# Patient Record
Sex: MALE | Race: BLACK OR AFRICAN AMERICAN | NOT HISPANIC OR LATINO | Employment: OTHER | URBAN - METROPOLITAN AREA
[De-identification: names, ages, dates, MRNs, and addresses within clinical notes are randomized per-mention and may not be internally consistent; named-entity substitution may affect disease eponyms.]

---

## 2024-01-01 ENCOUNTER — HOSPITAL ENCOUNTER (INPATIENT)
Facility: HOSPITAL | Age: 84
LOS: 1 days | DRG: 208 | End: 2024-09-30
Attending: EMERGENCY MEDICINE | Admitting: SURGERY
Payer: MEDICARE

## 2024-01-01 VITALS
HEART RATE: 82 BPM | DIASTOLIC BLOOD PRESSURE: 35 MMHG | WEIGHT: 119.94 LBS | SYSTOLIC BLOOD PRESSURE: 55 MMHG | OXYGEN SATURATION: 100 % | RESPIRATION RATE: 26 BRPM | BODY MASS INDEX: 18.18 KG/M2 | HEIGHT: 68 IN | TEMPERATURE: 99 F

## 2024-01-01 DIAGNOSIS — N17.9 AKI (ACUTE KIDNEY INJURY): ICD-10-CM

## 2024-01-01 DIAGNOSIS — K92.2 GASTROINTESTINAL HEMORRHAGE, UNSPECIFIED GASTROINTESTINAL HEMORRHAGE TYPE: Primary | ICD-10-CM

## 2024-01-01 DIAGNOSIS — J93.9 PNEUMOTHORAX: ICD-10-CM

## 2024-01-01 DIAGNOSIS — D64.9 ANEMIA, UNSPECIFIED TYPE: ICD-10-CM

## 2024-01-01 DIAGNOSIS — R79.89 ELEVATED TROPONIN: ICD-10-CM

## 2024-01-01 DIAGNOSIS — J93.9 PNEUMOTHORAX, UNSPECIFIED TYPE: ICD-10-CM
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
## 2024-01-01 DIAGNOSIS — S42.401A CLOSED FRACTURE OF DISTAL END OF RIGHT HUMERUS, UNSPECIFIED FRACTURE MORPHOLOGY, INITIAL ENCOUNTER: ICD-10-CM

## 2024-01-01 DIAGNOSIS — R91.8 LUNG MASS: ICD-10-CM

## 2024-01-01 LAB
ABO + RH BLD: NORMAL
ABO + RH BLD: NORMAL
ABORH RETYPE: NORMAL
ABS NEUT (OLG): 12.23 X10(3)/MCL (ref 2.1–9.2)
ABS NEUT (OLG): 2.22 X10(3)/MCL (ref 2.1–9.2)
ALBUMIN SERPL-MCNC: 2.3 G/DL (ref 3.4–4.8)
ALBUMIN SERPL-MCNC: 3.1 G/DL (ref 3.4–4.8)
ALBUMIN/GLOB SERPL: 1.2 RATIO (ref 1.1–2)
ALBUMIN/GLOB SERPL: 1.2 RATIO (ref 1.1–2)
ALLENS TEST BLOOD GAS (OHS): ABNORMAL
ALP SERPL-CCNC: 42 UNIT/L (ref 40–150)
ALP SERPL-CCNC: 51 UNIT/L (ref 40–150)
ALT SERPL-CCNC: 37 UNIT/L (ref 0–55)
ALT SERPL-CCNC: 41 UNIT/L (ref 0–55)
AMPHET UR QL SCN: NEGATIVE
ANION GAP SERPL CALC-SCNC: 12 MEQ/L
ANION GAP SERPL CALC-SCNC: 13 MEQ/L
ANISOCYTOSIS BLD QL SMEAR: ABNORMAL
APTT PPP: 29.3 SECONDS (ref 23.2–33.7)
AST SERPL-CCNC: 79 UNIT/L (ref 5–34)
AST SERPL-CCNC: 94 UNIT/L (ref 5–34)
AV INDEX (PROSTH): 0.85
AV MEAN GRADIENT: 2 MMHG
AV PEAK GRADIENT: 4.8 MMHG
AV VALVE AREA BY VELOCITY RATIO: 2.6 CM²
AV VALVE AREA: 2.7 CM²
AV VELOCITY RATIO: 0.82
BARBITURATE SCN PRESENT UR: NEGATIVE
BASE EXCESS BLD CALC-SCNC: -16 MMOL/L (ref -2–2)
BASE EXCESS BLD CALC-SCNC: -2.6 MMOL/L (ref -2–2)
BASE EXCESS BLD CALC-SCNC: -9.9 MMOL/L (ref -2–2)
BASOPHILS NFR BLD MANUAL: 0.03 X10(3)/MCL (ref 0–0.2)
BASOPHILS NFR BLD MANUAL: 1 %
BENZODIAZ UR QL SCN: NEGATIVE
BILIRUB SERPL-MCNC: 0.5 MG/DL
BILIRUB SERPL-MCNC: 0.8 MG/DL
BLD PROD TYP BPU: NORMAL
BLD PROD TYP BPU: NORMAL
BLOOD GAS SAMPLE TYPE (OHS): ABNORMAL
BLOOD UNIT EXPIRATION DATE: NORMAL
BLOOD UNIT EXPIRATION DATE: NORMAL
BLOOD UNIT TYPE CODE: 5100
BLOOD UNIT TYPE CODE: 5100
BNP BLD-MCNC: 354.8 PG/ML
BSA FOR ECHO PROCEDURE: 1.62 M2
BUN SERPL-MCNC: 76 MG/DL (ref 8.4–25.7)
BUN SERPL-MCNC: 77.5 MG/DL (ref 8.4–25.7)
BURR CELLS (OLG): ABNORMAL
BURR CELLS (OLG): ABNORMAL
CA-I BLD-SCNC: 1.08 MMOL/L (ref 1.12–1.23)
CA-I BLD-SCNC: 1.22 MMOL/L (ref 1.12–1.23)
CA-I BLD-SCNC: 1.32 MMOL/L (ref 1.12–1.23)
CALCIUM SERPL-MCNC: 7.8 MG/DL (ref 8.8–10)
CALCIUM SERPL-MCNC: 8.3 MG/DL (ref 8.8–10)
CANNABINOIDS UR QL SCN: NEGATIVE
CHLORIDE SERPL-SCNC: 126 MMOL/L (ref 98–107)
CHLORIDE SERPL-SCNC: 129 MMOL/L (ref 98–107)
CK SERPL-CCNC: 1384 U/L (ref 30–200)
CO2 BLDA-SCNC: 18 MMOL/L
CO2 BLDA-SCNC: 19 MMOL/L
CO2 BLDA-SCNC: 24.6 MMOL/L
CO2 SERPL-SCNC: 12 MMOL/L (ref 23–31)
CO2 SERPL-SCNC: 19 MMOL/L (ref 23–31)
COCAINE UR QL SCN: NEGATIVE
COHGB MFR BLDA: 0 % (ref 0.5–1.5)
COHGB MFR BLDA: 0 % (ref 0.5–1.5)
COHGB MFR BLDA: 0.7 % (ref 0.5–1.5)
CREAT SERPL-MCNC: 3.32 MG/DL (ref 0.73–1.18)
CREAT SERPL-MCNC: 3.75 MG/DL (ref 0.73–1.18)
CREAT/UREA NIT SERPL: 20
CREAT/UREA NIT SERPL: 23
CROSSMATCH INTERPRETATION: NORMAL
CROSSMATCH INTERPRETATION: NORMAL
CRP SERPL-MCNC: 71.7 MG/L
CV ECHO LV RWT: 0.44 CM
DISPENSE STATUS: NORMAL
DISPENSE STATUS: NORMAL
DOP CALC AO PEAK VEL: 1.1 M/S
DOP CALC AO VTI: 17.2 CM
DOP CALC LVOT AREA: 3.1 CM2
DOP CALC LVOT DIAMETER: 2 CM
DOP CALC LVOT PEAK VEL: 0.9 M/S
DOP CALC LVOT STROKE VOLUME: 46.2 CM3
DOP CALCLVOT PEAK VEL VTI: 14.7 CM
DRAWN BY BLOOD GAS (OHS): ABNORMAL
ECHO LV POSTERIOR WALL: 0.7 CM (ref 0.6–1.1)
ERYTHROCYTE [DISTWIDTH] IN BLOOD BY AUTOMATED COUNT: 18.6 % (ref 11.5–17)
ERYTHROCYTE [DISTWIDTH] IN BLOOD BY AUTOMATED COUNT: 18.8 % (ref 11.5–17)
ETHANOL SERPL-MCNC: <10 MG/DL
FENTANYL UR QL SCN: NEGATIVE
FLOW (OHS): 50 LPM
FLUAV AG UPPER RESP QL IA.RAPID: NOT DETECTED
FLUBV AG UPPER RESP QL IA.RAPID: NOT DETECTED
FRACTIONAL SHORTENING: 12.5 % (ref 28–44)
GFR SERPLBLD CREATININE-BSD FMLA CKD-EPI: 15 ML/MIN/1.73/M2
GFR SERPLBLD CREATININE-BSD FMLA CKD-EPI: 18 ML/MIN/1.73/M2
GLOBULIN SER-MCNC: 1.9 GM/DL (ref 2.4–3.5)
GLOBULIN SER-MCNC: 2.5 GM/DL (ref 2.4–3.5)
GLUCOSE SERPL-MCNC: 100 MG/DL (ref 82–115)
GLUCOSE SERPL-MCNC: 90 MG/DL (ref 82–115)
GROUP & RH: NORMAL
HCO3 BLDA-SCNC: 15.5 MMOL/L (ref 22–26)
HCO3 BLDA-SCNC: 17.6 MMOL/L (ref 22–26)
HCO3 BLDA-SCNC: 23.2 MMOL/L (ref 22–26)
HCT VFR BLD AUTO: 20.3 % (ref 42–52)
HCT VFR BLD AUTO: 25 % (ref 42–52)
HGB BLD-MCNC: 6 G/DL (ref 14–18)
HGB BLD-MCNC: 8.3 G/DL (ref 14–18)
INDIRECT COOMBS: NORMAL
INHALED O2 CONCENTRATION: 100 %
INHALED O2 CONCENTRATION: 70 %
INR PPP: 1.4
INSTRUMENT WBC (OLG): 13.74 X10(3)/MCL
INSTRUMENT WBC (OLG): 2.77 X10(3)/MCL
INTERVENTRICULAR SEPTUM: 0.9 CM (ref 0.6–1.1)
LACTATE SERPL-SCNC: 1.1 MMOL/L (ref 0.5–2.2)
LACTATE SERPL-SCNC: 2 MMOL/L (ref 0.5–2.2)
LEFT ATRIUM SIZE: 3 CM
LEFT INTERNAL DIMENSION IN SYSTOLE: 2.8 CM (ref 2.1–4)
LEFT VENTRICLE MASS INDEX: 39.8 G/M2
LEFT VENTRICULAR INTERNAL DIMENSION IN DIASTOLE: 3.2 CM (ref 3.5–6)
LEFT VENTRICULAR MASS: 65.3 G
LIPASE SERPL-CCNC: 19 U/L
LPM (OHS): 6
LYMPHOCYTES NFR BLD MANUAL: 0.44 X10(3)/MCL
LYMPHOCYTES NFR BLD MANUAL: 0.96 X10(3)/MCL
LYMPHOCYTES NFR BLD MANUAL: 16 %
LYMPHOCYTES NFR BLD MANUAL: 7 %
MACROCYTES BLD QL SMEAR: ABNORMAL
MAGNESIUM SERPL-MCNC: 2 MG/DL (ref 1.6–2.6)
MAGNESIUM SERPL-MCNC: 2.5 MG/DL (ref 1.6–2.6)
MCH RBC QN AUTO: 31.4 PG (ref 27–31)
MCH RBC QN AUTO: 34.7 PG (ref 27–31)
MCHC RBC AUTO-ENTMCNC: 29.6 G/DL (ref 33–36)
MCHC RBC AUTO-ENTMCNC: 33.2 G/DL (ref 33–36)
MCV RBC AUTO: 117.3 FL (ref 80–94)
MCV RBC AUTO: 94.7 FL (ref 80–94)
MDMA UR QL SCN: NEGATIVE
MECH RR (OHS): 26 B/MIN
MECH RR (OHS): 26 B/MIN
METHGB MFR BLDA: 0 % (ref 0.4–1.5)
MODE (OHS): AC
MODE (OHS): AC
MONOCYTES NFR BLD MANUAL: 0.55 X10(3)/MCL (ref 0.1–1.3)
MONOCYTES NFR BLD MANUAL: 4 %
MYELOCYTES NFR BLD MANUAL: 3 %
NEUTROPHILS NFR BLD MANUAL: 80 %
NEUTROPHILS NFR BLD MANUAL: 89 %
NRBC BLD AUTO-RTO: 15.4 %
NRBC BLD AUTO-RTO: 41.5 %
NRBC BLD MANUAL-RTO: 108 %
NRBC BLD MANUAL-RTO: 35 %
O2 HB BLOOD GAS (OHS): 89.1 % (ref 94–97)
O2 HB BLOOD GAS (OHS): 90.9 % (ref 94–97)
O2 HB BLOOD GAS (OHS): 96.3 % (ref 94–97)
OHS QRS DURATION: 116 MS
OHS QRS DURATION: 116 MS
OHS QTC CALCULATION: 442 MS
OHS QTC CALCULATION: 476 MS
OPIATES UR QL SCN: NEGATIVE
OXYGEN DEVICE BLOOD GAS (OHS): ABNORMAL
OXYHGB MFR BLDA: 7.7 G/DL (ref 12–16)
OXYHGB MFR BLDA: 8.1 G/DL (ref 12–16)
OXYHGB MFR BLDA: <6.5 G/DL (ref 12–16)
PCO2 BLDA: 44 MMHG (ref 35–45)
PCO2 BLDA: 46 MMHG (ref 35–45)
PCO2 BLDA: 81 MMHG (ref 35–45)
PCP UR QL: NEGATIVE
PEEP RESPIRATORY: 5 CMH2O
PEEP RESPIRATORY: 8 CMH2O
PH BLDA: 7.19 [PH] (ref 7.35–7.45)
PH BLDA: 7.33 [PH] (ref 7.35–7.45)
PH BLDA: <6.95 [PH] (ref 7.35–7.45)
PH UR: 6 [PH] (ref 3–11)
PHOSPHATE SERPL-MCNC: 6.4 MG/DL (ref 2.3–4.7)
PISA TR MAX VEL: 2.2 M/S
PLATELET # BLD AUTO: 172 X10(3)/MCL (ref 130–400)
PLATELET # BLD AUTO: 212 X10(3)/MCL (ref 130–400)
PLATELET # BLD EST: NORMAL 10*3/UL
PLATELET # BLD EST: NORMAL 10*3/UL
PMV BLD AUTO: 11.1 FL (ref 7.4–10.4)
PMV BLD AUTO: 11.6 FL (ref 7.4–10.4)
PO2 BLDA: 143 MMHG (ref 80–100)
PO2 BLDA: 58 MMHG (ref 80–100)
PO2 BLDA: 73 MMHG (ref 80–100)
POCT GLUCOSE: 122 MG/DL (ref 70–110)
POIKILOCYTOSIS BLD QL SMEAR: ABNORMAL
POIKILOCYTOSIS BLD QL SMEAR: ABNORMAL
POLYCHROMASIA BLD QL SMEAR: ABNORMAL
POTASSIUM BLOOD GAS (OHS): 3.7 MMOL/L (ref 3.5–5)
POTASSIUM BLOOD GAS (OHS): 4.8 MMOL/L (ref 3.5–5)
POTASSIUM BLOOD GAS (OHS): 5.7 MMOL/L (ref 3.5–5)
POTASSIUM SERPL-SCNC: 4.4 MMOL/L (ref 3.5–5.1)
POTASSIUM SERPL-SCNC: 5.6 MMOL/L (ref 3.5–5.1)
PREALB SERPL-MCNC: 12.7 MG/DL (ref 16–42)
PROT SERPL-MCNC: 4.2 GM/DL (ref 5.8–7.6)
PROT SERPL-MCNC: 5.6 GM/DL (ref 5.8–7.6)
PROTHROMBIN TIME: 16.8 SECONDS (ref 12.5–14.5)
RBC # BLD AUTO: 1.73 X10(6)/MCL (ref 4.7–6.1)
RBC # BLD AUTO: 2.64 X10(6)/MCL (ref 4.7–6.1)
RBC MORPH BLD: ABNORMAL
RBC MORPH BLD: ABNORMAL
SAMPLE SITE BLOOD GAS (OHS): ABNORMAL
SAO2 % BLDA: 78.4 %
SAO2 % BLDA: 87.5 %
SAO2 % BLDA: 98.7 %
SARS-COV-2 RNA RESP QL NAA+PROBE: NOT DETECTED
SODIUM BLOOD GAS (OHS): 148 MMOL/L (ref 137–145)
SODIUM BLOOD GAS (OHS): 149 MMOL/L (ref 137–145)
SODIUM BLOOD GAS (OHS): 152 MMOL/L (ref 137–145)
SODIUM SERPL-SCNC: 153 MMOL/L (ref 136–145)
SODIUM SERPL-SCNC: 158 MMOL/L (ref 136–145)
SPECIFIC GRAVITY, URINE AUTO (.000) (OHS): 1.02 (ref 1–1.03)
SPECIMEN OUTDATE: NORMAL
SPONT+MECH VT ON VENT: 450 ML
SPONT+MECH VT ON VENT: 450 ML
TARGETS BLD QL SMEAR: ABNORMAL
TR MAX PG: 19 MMHG
TRICUSPID ANNULAR PLANE SYSTOLIC EXCURSION: 1.2 CM
TROPONIN I SERPL-MCNC: 0.04 NG/ML (ref 0–0.04)
TROPONIN I SERPL-MCNC: 0.05 NG/ML (ref 0–0.04)
TROPONIN I SERPL-MCNC: 0.05 NG/ML (ref 0–0.04)
UNIT NUMBER: NORMAL
UNIT NUMBER: NORMAL
WBC # BLD AUTO: 13.74 X10(3)/MCL (ref 4.5–11.5)
WBC # BLD AUTO: 2.77 X10(3)/MCL (ref 4.5–11.5)
Z-SCORE OF LEFT VENTRICULAR DIMENSION IN END DIASTOLE: -3.66
Z-SCORE OF LEFT VENTRICULAR DIMENSION IN END SYSTOLE: -0.17

## 2024-01-01 PROCEDURE — 85007 BL SMEAR W/DIFF WBC COUNT: CPT

## 2024-01-01 PROCEDURE — 86140 C-REACTIVE PROTEIN: CPT

## 2024-01-01 PROCEDURE — 99291 CRITICAL CARE FIRST HOUR: CPT | Mod: ,,, | Performed by: SURGERY

## 2024-01-01 PROCEDURE — 20800000 HC ICU TRAUMA

## 2024-01-01 PROCEDURE — 25000003 PHARM REV CODE 250

## 2024-01-01 PROCEDURE — 85007 BL SMEAR W/DIFF WBC COUNT: CPT | Performed by: EMERGENCY MEDICINE

## 2024-01-01 PROCEDURE — 36600 WITHDRAWAL OF ARTERIAL BLOOD: CPT

## 2024-01-01 PROCEDURE — 99900031 HC PATIENT EDUCATION (STAT)

## 2024-01-01 PROCEDURE — P9016 RBC LEUKOCYTES REDUCED: HCPCS | Performed by: EMERGENCY MEDICINE

## 2024-01-01 PROCEDURE — 84484 ASSAY OF TROPONIN QUANT: CPT | Performed by: EMERGENCY MEDICINE

## 2024-01-01 PROCEDURE — 86923 COMPATIBILITY TEST ELECTRIC: CPT | Mod: 91 | Performed by: EMERGENCY MEDICINE

## 2024-01-01 PROCEDURE — 63600175 PHARM REV CODE 636 W HCPCS

## 2024-01-01 PROCEDURE — 63600175 PHARM REV CODE 636 W HCPCS: Performed by: SURGERY

## 2024-01-01 PROCEDURE — 84134 ASSAY OF PREALBUMIN: CPT

## 2024-01-01 PROCEDURE — 85027 COMPLETE CBC AUTOMATED: CPT | Performed by: EMERGENCY MEDICINE

## 2024-01-01 PROCEDURE — 82803 BLOOD GASES ANY COMBINATION: CPT

## 2024-01-01 PROCEDURE — 83605 ASSAY OF LACTIC ACID: CPT | Performed by: EMERGENCY MEDICINE

## 2024-01-01 PROCEDURE — 0BH17EZ INSERTION OF ENDOTRACHEAL AIRWAY INTO TRACHEA, VIA NATURAL OR ARTIFICIAL OPENING: ICD-10-PCS

## 2024-01-01 PROCEDURE — 36415 COLL VENOUS BLD VENIPUNCTURE: CPT

## 2024-01-01 PROCEDURE — 99900035 HC TECH TIME PER 15 MIN (STAT)

## 2024-01-01 PROCEDURE — 63600175 PHARM REV CODE 636 W HCPCS: Performed by: EMERGENCY MEDICINE

## 2024-01-01 PROCEDURE — 83735 ASSAY OF MAGNESIUM: CPT | Performed by: EMERGENCY MEDICINE

## 2024-01-01 PROCEDURE — 0240U COVID/FLU A&B PCR: CPT | Performed by: EMERGENCY MEDICINE

## 2024-01-01 PROCEDURE — 86901 BLOOD TYPING SEROLOGIC RH(D): CPT | Performed by: EMERGENCY MEDICINE

## 2024-01-01 PROCEDURE — 84100 ASSAY OF PHOSPHORUS: CPT

## 2024-01-01 PROCEDURE — 80053 COMPREHEN METABOLIC PANEL: CPT | Performed by: EMERGENCY MEDICINE

## 2024-01-01 PROCEDURE — 0W9930Z DRAINAGE OF RIGHT PLEURAL CAVITY WITH DRAINAGE DEVICE, PERCUTANEOUS APPROACH: ICD-10-PCS | Performed by: EMERGENCY MEDICINE

## 2024-01-01 PROCEDURE — 80053 COMPREHEN METABOLIC PANEL: CPT

## 2024-01-01 PROCEDURE — 96361 HYDRATE IV INFUSION ADD-ON: CPT | Mod: 59

## 2024-01-01 PROCEDURE — 84484 ASSAY OF TROPONIN QUANT: CPT

## 2024-01-01 PROCEDURE — 86900 BLOOD TYPING SEROLOGIC ABO: CPT | Performed by: EMERGENCY MEDICINE

## 2024-01-01 PROCEDURE — 82550 ASSAY OF CK (CPK): CPT | Performed by: EMERGENCY MEDICINE

## 2024-01-01 PROCEDURE — 86850 RBC ANTIBODY SCREEN: CPT | Performed by: EMERGENCY MEDICINE

## 2024-01-01 PROCEDURE — 83880 ASSAY OF NATRIURETIC PEPTIDE: CPT | Performed by: EMERGENCY MEDICINE

## 2024-01-01 PROCEDURE — 25000003 PHARM REV CODE 250: Performed by: SURGERY

## 2024-01-01 PROCEDURE — 87040 BLOOD CULTURE FOR BACTERIA: CPT | Performed by: EMERGENCY MEDICINE

## 2024-01-01 PROCEDURE — 83605 ASSAY OF LACTIC ACID: CPT

## 2024-01-01 PROCEDURE — 94760 N-INVAS EAR/PLS OXIMETRY 1: CPT | Mod: XB

## 2024-01-01 PROCEDURE — 25000003 PHARM REV CODE 250: Mod: JZ,JG | Performed by: EMERGENCY MEDICINE

## 2024-01-01 PROCEDURE — 80307 DRUG TEST PRSMV CHEM ANLYZR: CPT | Performed by: EMERGENCY MEDICINE

## 2024-01-01 PROCEDURE — 99291 CRITICAL CARE FIRST HOUR: CPT | Mod: 25

## 2024-01-01 PROCEDURE — 93010 ELECTROCARDIOGRAM REPORT: CPT | Mod: ,,, | Performed by: STUDENT IN AN ORGANIZED HEALTH CARE EDUCATION/TRAINING PROGRAM

## 2024-01-01 PROCEDURE — 31500 INSERT EMERGENCY AIRWAY: CPT

## 2024-01-01 PROCEDURE — 85610 PROTHROMBIN TIME: CPT | Performed by: EMERGENCY MEDICINE

## 2024-01-01 PROCEDURE — 99223 1ST HOSP IP/OBS HIGH 75: CPT | Mod: FS,,, | Performed by: NURSE PRACTITIONER

## 2024-01-01 PROCEDURE — 96374 THER/PROPH/DIAG INJ IV PUSH: CPT | Mod: 59

## 2024-01-01 PROCEDURE — 5A1935Z RESPIRATORY VENTILATION, LESS THAN 24 CONSECUTIVE HOURS: ICD-10-PCS

## 2024-01-01 PROCEDURE — 82077 ASSAY SPEC XCP UR&BREATH IA: CPT | Performed by: EMERGENCY MEDICINE

## 2024-01-01 PROCEDURE — 93005 ELECTROCARDIOGRAM TRACING: CPT

## 2024-01-01 PROCEDURE — 36430 TRANSFUSION BLD/BLD COMPNT: CPT | Mod: 59

## 2024-01-01 PROCEDURE — 83735 ASSAY OF MAGNESIUM: CPT

## 2024-01-01 PROCEDURE — 85730 THROMBOPLASTIN TIME PARTIAL: CPT | Performed by: EMERGENCY MEDICINE

## 2024-01-01 PROCEDURE — 96375 TX/PRO/DX INJ NEW DRUG ADDON: CPT | Mod: 59

## 2024-01-01 PROCEDURE — 27100171 HC OXYGEN HIGH FLOW UP TO 24 HOURS

## 2024-01-01 PROCEDURE — 85027 COMPLETE CBC AUTOMATED: CPT

## 2024-01-01 PROCEDURE — 83690 ASSAY OF LIPASE: CPT | Performed by: EMERGENCY MEDICINE

## 2024-01-01 PROCEDURE — 94002 VENT MGMT INPAT INIT DAY: CPT

## 2024-01-01 RX ORDER — CALCIUM GLUCONATE 20 MG/ML
1 INJECTION, SOLUTION INTRAVENOUS
Status: COMPLETED | OUTPATIENT
Start: 2024-01-01 | End: 2024-01-01

## 2024-01-01 RX ORDER — SODIUM BICARBONATE 1 MEQ/ML
100 SYRINGE (ML) INTRAVENOUS ONCE
Status: COMPLETED | OUTPATIENT
Start: 2024-01-01 | End: 2024-01-01

## 2024-01-01 RX ORDER — LIDOCAINE HYDROCHLORIDE 10 MG/ML
10 INJECTION, SOLUTION INFILTRATION; PERINEURAL ONCE
Status: DISCONTINUED | OUTPATIENT
Start: 2024-01-01 | End: 2024-01-01 | Stop reason: HOSPADM

## 2024-01-01 RX ORDER — MUPIROCIN 20 MG/G
OINTMENT TOPICAL 2 TIMES DAILY
Status: DISCONTINUED | OUTPATIENT
Start: 2024-10-02 | End: 2024-01-01 | Stop reason: HOSPADM

## 2024-01-01 RX ORDER — INDOMETHACIN 25 MG/1
CAPSULE ORAL
Status: DISCONTINUED
Start: 2024-01-01 | End: 2024-01-01 | Stop reason: HOSPADM

## 2024-01-01 RX ORDER — POLYETHYLENE GLYCOL 3350 17 G/17G
17 POWDER, FOR SOLUTION ORAL 2 TIMES DAILY
Status: DISCONTINUED | OUTPATIENT
Start: 2024-01-01 | End: 2024-01-01 | Stop reason: HOSPADM

## 2024-01-01 RX ORDER — SODIUM CHLORIDE, SODIUM LACTATE, POTASSIUM CHLORIDE, CALCIUM CHLORIDE 600; 310; 30; 20 MG/100ML; MG/100ML; MG/100ML; MG/100ML
INJECTION, SOLUTION INTRAVENOUS CONTINUOUS
Status: DISCONTINUED | OUTPATIENT
Start: 2024-01-01 | End: 2024-01-01 | Stop reason: HOSPADM

## 2024-01-01 RX ORDER — PROPOFOL 10 MG/ML
INJECTION, EMULSION INTRAVENOUS
Status: COMPLETED
Start: 2024-01-01 | End: 2024-01-01

## 2024-01-01 RX ORDER — ETOMIDATE 2 MG/ML
INJECTION INTRAVENOUS CODE/TRAUMA/SEDATION MEDICATION
Status: COMPLETED | OUTPATIENT
Start: 2024-01-01 | End: 2024-01-01

## 2024-01-01 RX ORDER — SODIUM BICARBONATE 1 MEQ/ML
VIAL (ML) INTRAVENOUS CONTINUOUS
Status: DISCONTINUED | OUTPATIENT
Start: 2024-01-01 | End: 2024-01-01

## 2024-01-01 RX ORDER — HYDROCODONE BITARTRATE AND ACETAMINOPHEN 500; 5 MG/1; MG/1
TABLET ORAL
Status: DISCONTINUED | OUTPATIENT
Start: 2024-01-01 | End: 2024-01-01 | Stop reason: HOSPADM

## 2024-01-01 RX ORDER — BISACODYL 10 MG/1
10 SUPPOSITORY RECTAL DAILY PRN
Status: DISCONTINUED | OUTPATIENT
Start: 2024-01-01 | End: 2024-01-01 | Stop reason: HOSPADM

## 2024-01-01 RX ORDER — PROPOFOL 10 MG/ML
0-50 INJECTION, EMULSION INTRAVENOUS CONTINUOUS
Status: DISCONTINUED | OUTPATIENT
Start: 2024-01-01 | End: 2024-01-01 | Stop reason: HOSPADM

## 2024-01-01 RX ORDER — ACETAMINOPHEN 325 MG/1
650 TABLET ORAL EVERY 4 HOURS
Status: DISCONTINUED | OUTPATIENT
Start: 2024-01-01 | End: 2024-01-01 | Stop reason: HOSPADM

## 2024-01-01 RX ORDER — ATROPINE SULFATE 0.1 MG/ML
INJECTION INTRAVENOUS CODE/TRAUMA/SEDATION MEDICATION
Status: COMPLETED | OUTPATIENT
Start: 2024-01-01 | End: 2024-01-01

## 2024-01-01 RX ORDER — SODIUM BICARBONATE 1 MEQ/ML
SYRINGE (ML) INTRAVENOUS CODE/TRAUMA/SEDATION MEDICATION
Status: COMPLETED | OUTPATIENT
Start: 2024-01-01 | End: 2024-01-01

## 2024-01-01 RX ORDER — OXYCODONE HYDROCHLORIDE 5 MG/1
5 TABLET ORAL EVERY 4 HOURS PRN
Status: DISCONTINUED | OUTPATIENT
Start: 2024-01-01 | End: 2024-01-01 | Stop reason: HOSPADM

## 2024-01-01 RX ORDER — MORPHINE SULFATE 4 MG/ML
2 INJECTION, SOLUTION INTRAMUSCULAR; INTRAVENOUS
Status: DISCONTINUED | OUTPATIENT
Start: 2024-01-01 | End: 2024-01-01 | Stop reason: HOSPADM

## 2024-01-01 RX ORDER — PANTOPRAZOLE SODIUM 40 MG/10ML
40 INJECTION, POWDER, LYOPHILIZED, FOR SOLUTION INTRAVENOUS 2 TIMES DAILY
Status: DISCONTINUED | OUTPATIENT
Start: 2024-01-01 | End: 2024-01-01 | Stop reason: HOSPADM

## 2024-01-01 RX ORDER — ROCURONIUM BROMIDE 10 MG/ML
INJECTION, SOLUTION INTRAVENOUS CODE/TRAUMA/SEDATION MEDICATION
Status: COMPLETED | OUTPATIENT
Start: 2024-01-01 | End: 2024-01-01

## 2024-01-01 RX ORDER — TALC
6 POWDER (GRAM) TOPICAL NIGHTLY PRN
Status: DISCONTINUED | OUTPATIENT
Start: 2024-01-01 | End: 2024-01-01 | Stop reason: HOSPADM

## 2024-01-01 RX ORDER — FAMOTIDINE 10 MG/ML
20 INJECTION INTRAVENOUS DAILY
Status: DISCONTINUED | OUTPATIENT
Start: 2024-01-01 | End: 2024-01-01

## 2024-01-01 RX ORDER — VANCOMYCIN HCL IN 5 % DEXTROSE 1G/250ML
1000 PLASTIC BAG, INJECTION (ML) INTRAVENOUS
Status: DISCONTINUED | OUTPATIENT
Start: 2024-01-01 | End: 2024-01-01

## 2024-01-01 RX ORDER — DOCUSATE SODIUM 100 MG/1
100 CAPSULE, LIQUID FILLED ORAL 2 TIMES DAILY
Status: DISCONTINUED | OUTPATIENT
Start: 2024-01-01 | End: 2024-01-01 | Stop reason: HOSPADM

## 2024-01-01 RX ORDER — PANTOPRAZOLE SODIUM 40 MG/10ML
80 INJECTION, POWDER, LYOPHILIZED, FOR SOLUTION INTRAVENOUS
Status: COMPLETED | OUTPATIENT
Start: 2024-01-01 | End: 2024-01-01

## 2024-01-01 RX ADMIN — SODIUM CHLORIDE, POTASSIUM CHLORIDE, SODIUM LACTATE AND CALCIUM CHLORIDE: 600; 310; 30; 20 INJECTION, SOLUTION INTRAVENOUS at 04:09

## 2024-01-01 RX ADMIN — PROPOFOL 20 MCG/KG/MIN: 10 INJECTION, EMULSION INTRAVENOUS at 01:09

## 2024-01-01 RX ADMIN — SODIUM BICARBONATE 50 MEQ: 84 INJECTION INTRAVENOUS at 01:09

## 2024-01-01 RX ADMIN — ACETAMINOPHEN 650 MG: 325 TABLET, FILM COATED ORAL at 09:09

## 2024-01-01 RX ADMIN — SODIUM PHOSPHATE, MONOBASIC, MONOHYDRATE AND SODIUM PHOSPHATE, DIBASIC, ANHYDROUS 30 MMOL: 142; 276 INJECTION, SOLUTION INTRAVENOUS at 09:09

## 2024-01-01 RX ADMIN — POLYETHYLENE GLYCOL 3350 17 G: 17 POWDER, FOR SOLUTION ORAL at 09:09

## 2024-01-01 RX ADMIN — DOCUSATE SODIUM 100 MG: 100 CAPSULE, LIQUID FILLED ORAL at 09:09

## 2024-01-01 RX ADMIN — SODIUM BICARBONATE 100 MEQ: 84 INJECTION, SOLUTION INTRAVENOUS at 10:09

## 2024-01-01 RX ADMIN — CALCIUM GLUCONATE 1 G: 20 INJECTION, SOLUTION INTRAVENOUS at 12:09

## 2024-01-01 RX ADMIN — PANTOPRAZOLE SODIUM 40 MG: 40 INJECTION, POWDER, LYOPHILIZED, FOR SOLUTION INTRAVENOUS at 09:09

## 2024-01-01 RX ADMIN — ATROPINE SULFATE 1 MG: 0.1 INJECTION INTRAVENOUS at 01:09

## 2024-01-01 RX ADMIN — SODIUM BICARBONATE: 84 INJECTION, SOLUTION INTRAVENOUS at 05:09

## 2024-01-01 RX ADMIN — PANTOPRAZOLE SODIUM 80 MG: 40 INJECTION, POWDER, LYOPHILIZED, FOR SOLUTION INTRAVENOUS at 12:09

## 2024-01-01 RX ADMIN — ETOMIDATE 20 MG: 2 INJECTION INTRAVENOUS at 01:09

## 2024-01-01 RX ADMIN — ROCURONIUM BROMIDE 100 MG: 10 SOLUTION INTRAVENOUS at 01:09

## 2024-01-01 RX ADMIN — SODIUM CHLORIDE, POTASSIUM CHLORIDE, SODIUM LACTATE AND CALCIUM CHLORIDE 1000 ML: 600; 310; 30; 20 INJECTION, SOLUTION INTRAVENOUS at 12:09

## 2024-01-01 RX ADMIN — SODIUM BICARBONATE 100 MEQ: 84 INJECTION INTRAVENOUS at 03:09

## 2024-01-01 RX ADMIN — PIPERACILLIN AND TAZOBACTAM 4.5 G: 4; .5 INJECTION, POWDER, LYOPHILIZED, FOR SOLUTION INTRAVENOUS; PARENTERAL at 02:09

## 2024-09-30 PROBLEM — F02.B3 MODERATE LATE ONSET ALZHEIMER'S DEMENTIA WITH MOOD DISTURBANCE: Chronic | Status: ACTIVE | Noted: 2024-01-01

## 2024-09-30 PROBLEM — N18.6 ESRD (END STAGE RENAL DISEASE): Chronic | Status: ACTIVE | Noted: 2024-01-01

## 2024-09-30 PROBLEM — J96.01 ACUTE HYPOXEMIC RESPIRATORY FAILURE: Status: ACTIVE | Noted: 2024-01-01

## 2024-09-30 PROBLEM — S42.401D CLOSED FRACTURE OF DISTAL END OF RIGHT HUMERUS WITH ROUTINE HEALING: Status: ACTIVE | Noted: 2024-01-01

## 2024-09-30 PROBLEM — R39.14 BENIGN PROSTATIC HYPERPLASIA WITH INCOMPLETE BLADDER EMPTYING: Chronic | Status: ACTIVE | Noted: 2024-01-01

## 2024-09-30 PROBLEM — J45.20 MILD INTERMITTENT ASTHMA WITHOUT COMPLICATION: Chronic | Status: ACTIVE | Noted: 2024-01-01

## 2024-09-30 PROBLEM — N40.1 BENIGN PROSTATIC HYPERPLASIA WITH INCOMPLETE BLADDER EMPTYING: Chronic | Status: ACTIVE | Noted: 2024-01-01

## 2024-09-30 PROBLEM — S27.0XXA TRAUMATIC PNEUMOTHORAX: Status: ACTIVE | Noted: 2024-01-01

## 2024-09-30 PROBLEM — E87.20 ACIDOSIS: Status: ACTIVE | Noted: 2024-01-01

## 2024-09-30 PROBLEM — G30.1 MODERATE LATE ONSET ALZHEIMER'S DEMENTIA WITH MOOD DISTURBANCE: Chronic | Status: ACTIVE | Noted: 2024-01-01

## 2024-09-30 NOTE — PROGRESS NOTES
Inpatient Nutrition Assessment    Admit Date: 9/29/2024   Total duration of encounter: 1 day   Patient Age: 84 y.o.    Nutrition Recommendation/Prescription     Start tube feeding when appropriate.  Tube feeding recommendation:     Peptamen AF goal rate 55 ml/hr to provide  1320 kcal/d  (100% est needs)  83 g protein/d  (101% est needs)  891 ml free water/d  (calculations based on estimated 20 hr/d run time)     Communication of Recommendations: reviewed with nurse    Nutrition Assessment     Malnutrition Assessment/Nutrition-Focused Physical Exam    Malnutrition Context:  (does not meet criteria\) (09/30/24 1018)  Malnutrition Level:  (does not meet criteria) (09/30/24 1018)  Energy Intake (Malnutrition):  (unable to eval) (09/30/24 1018)  Weight Loss (Malnutrition):  (unable to eval, no previous EMR wts) (09/30/24 1018)  Subcutaneous Fat (Malnutrition):  (does not meet criteria) (09/30/24 1018)           Muscle Mass (Malnutrition): mild depletion (09/30/24 1018)  [place] (Muscle Loss): mild depletion  Clavicle Bone Region (Muscle Loss): mild depletion                    Fluid Accumulation (Malnutrition):  (does not meet criteria) (09/30/24 1018)        A minimum of two characteristics is recommended for diagnosis of either severe or non-severe malnutrition.    Chart Review    Reason Seen: continuous nutrition monitoring and malnutrition screening tool (MST)    Malnutrition Screening Tool Results   Have you recently lost weight without trying?: Unsure  Have you been eating poorly because of a decreased appetite?: Yes   MST Score: 3   Diagnosis:  ESRD, JALYN on CKD  Elevated troponin  Acute respiratory failure  Right pneumothorax  Right humerus fracture  GI bleed  Incidental right inguinal hernia  Incidental left lung mass    Relevant Medical History: none listed per MD notes    Scheduled Medications:  acetaminophen, 650 mg, Q4H  docusate sodium, 100 mg, BID  insulin regular, 10 Units, ED 1 Time  LIDOcaine HCL  10 mg/ml (1%), 10 mL, Once  [START ON 10/2/2024] mupirocin, , BID  pantoprazole, 40 mg, BID  polyethylene glycol, 17 g, BID  sodium bicarbonate, ,   sodium bicarbonate, 100 mEq, Once  sodium phosphate 30 mmol in D5W 250 mL IVPB, 30 mmol, Once    Continuous Infusions:  lactated ringers, Last Rate: 125 mL/hr at 09/30/24 0412  propofoL, Last Rate: 20 mcg/kg/min (09/30/24 0144)    PRN Medications:  0.9%  NaCl infusion (for blood administration), , Q24H PRN  bisacodyL, 10 mg, Daily PRN  dextrose 10%, 12.5 g, PRN  dextrose 10%, 25 g, PRN  melatonin, 6 mg, Nightly PRN  morphine, 2 mg, Q2H PRN  oxyCODONE, 5 mg, Q4H PRN  sodium bicarbonate, ,     Calorie Containing IV Medications: Diprivan @ 6.5 ml/hr (provides 170 kcal/d)    Recent Labs   Lab 09/29/24  2157 09/29/24  2240 09/30/24  0651 09/30/24  0652   *  --  158*  --    K 5.6*  --  4.4  --    CALCIUM 8.3*  --  7.8*  --    PHOS  --   --  6.4*  --    MG 2.50  --  2.00  --    *  --  126*  --    CO2 12*  --  19*  --    BUN 76.0*  --  77.5*  --    CREATININE 3.75*  --  3.32*  --    EGFRNORACEVR 15  --  18  --    GLUCOSE 90  --  100  --    BILITOT 0.5  --  0.8  --    ALKPHOS 51  --  42  --    ALT 41  --  37  --    AST 94*  --  79*  --    ALBUMIN 3.1*  --  2.3*  --    CRP  --   --  71.70*  --    LIPASE 19  --   --   --    WBC  --  13.74  13.74*  --  2.77  2.77*   HGB  --  6.0*  --  8.3*   HCT  --  20.3*  --  25.0*     Nutrition Orders:  No diet orders on file      Appetite/Oral Intake: not applicable/not applicable  Factors Affecting Nutritional Intake: on mechanical ventilation  Social Needs Impacting Access to Food: unable to assess at this time; will attempt on follow-up  Food/Islam/Cultural Preferences: unable to obtain  Food Allergies: no known food allergies  Last Bowel Movement: 09/30/24  Wound(s):  None documented     Comments    9/30/24: Discussed with RN. Will provide tube feeding recommendations for when appropriate to start tube feeding. Receiving  "kcal from meds.  Noted elevated Phos, plans for HD. Will monitor for need for renal formula if Phos remains elevated. Currently using higher protein formula. Noted MST, no previous EMR wts and pt unable to verify.     Anthropometrics    Height: 5' 7.99" (172.7 cm), Height Method: Estimated  Last Weight: 54.4 kg (119 lb 14.9 oz) (09/30/24 0404), Weight Method: Bed Scale  BMI (Calculated): 18.2  BMI Classification: normal (BMI 18.5-24.9)        Ideal Body Weight (IBW), Male: 153.94 lb     % Ideal Body Weight, Male (lb): 77.88 %                          Usual Weight Provided By: unable to obtain usual weight    Wt Readings from Last 5 Encounters:   09/30/24 54.4 kg (119 lb 14.9 oz)     Weight Change(s) Since Admission:   Wt Readings from Last 1 Encounters:   09/30/24 0404 54.4 kg (119 lb 14.9 oz)   09/29/24 2043 54.4 kg (120 lb)   Admit Weight: 54.4 kg (120 lb) (09/29/24 2043), Weight Method: Bed Scale    Estimated Needs    Weight Used For Calorie Calculations: 54.5 kg (120 lb 2.4 oz)  Energy Calorie Requirements (kcal): 1317kcal  Energy Need Method: [facility]  Weight Used For Protein Calculations: 54.4 kg (119 lb 14.9 oz)  Protein Requirements: 82gm (1.5g/kg)  Fluid Requirements (mL): 1000ml + urinary outpu  CHO Requirement: 150gm (45% est kcal needs)     Enteral Nutrition     Patient not receiving enteral nutrition at this time.    Parenteral Nutrition     Patient not receiving parenteral nutrition support at this time.    Evaluation of Received Nutrient Intake    Calories: not meeting estimated needs  Protein: not meeting estimated needs    Patient Education     Not applicable.    Nutrition Diagnosis     PES: Inadequate oral intake related to acute illness as evidenced by intubation since admit. (new)     Nutrition Interventions     Intervention(s): modified composition of enteral nutrition, modified rate of enteral nutrition, and collaboration with other providers    Goal: Meet greater than 80% of nutritional " needs by follow-up. (new)  Goal: Tolerate enteral feeding at goal rate by follow-up. (new)    Nutrition Goals & Monitoring     Dietitian will monitor: energy intake and enteral nutrition intake  Discharge planning: too early to determine; pending clinical course  Nutrition Risk/Follow-Up: high (follow-up in 1-4 days)   Please consult if re-assessment needed sooner.

## 2024-09-30 NOTE — H&P
Trauma Surgery   History and Physical Note    Patient Name: Brittany Ugarte  YOB: 1940  Date: 09/30/2024 12:54 AM  Date of Admission: 9/29/2024  HD#0  POD#* No surgery found *    PRESENTING HISTORY   Chief Complaint/Reason for Admission: <principal problem not specified>    History of Present Illness:  Patient is an 84 yM who presented as a transfer from OSH after being found down in his house with blood per rectum. Per records, EMS performed needle decompression of the right chest in the field. At OSH, patient was found to have right-sided pneumothorax and a right chest tube was placed. Right humerus fracture was noted on plain films. He was also found to be anemic with Hgb of 5.4, suspected 2/2 GI bleed, but was not transfused at OSH due to blood bank/lab issues. Trauma surgery consulted upon arrival to our facility. Family present at bedside, who report that patient lives by himself and is independent with his ADLs. They note a history of ESRD, which has worsened over the past few months, although he has not been on dialysis in the past. Patient's daughter and son expressed the patient's wish to be DNR, but did desire intubation/mechanical ventilation if needed.    In the ED, patient was afebrile and normotensive. He was GCS of 6 on arrival, on 8 L supplemental O2 via oxymask. Hgb was 5.6, 2 units pRBC transfused in ED. CMP notable for sodium of 153, potassium of 5.6, chloride of 129, CO2 of 12, Cr 3.75, BUN 76. Troponin elevated to 0.053, CPK 1,384, .8. Lactate normal. UDS, EtOH negative.     ABG revealed pH 6.95, PCO2 81, PO2 73, bicarb 15.5. GCS of 6. Patient was intubated, propofol gtt started. Bradycardic into 30s during intubation, atropine given. Received total of 4 amps of bicarb in ED, started on bicarb drip.    Review of Systems:  ROS unable to be completed due to patient condition    PAST HISTORY:   Past medical history:  No past medical history on file.    Past surgical  history:  No past surgical history on file.    Family history:  No family history on file.    Social history:  Social History     Socioeconomic History    Marital status:      Social History     Tobacco Use   Smoking Status Not on file   Smokeless Tobacco Not on file      Social History     Substance and Sexual Activity   Alcohol Use Not on file        MEDICATIONS & ALLERGIES:   Allergies: Review of patient's allergies indicates:  No Known Allergies  Home Meds: No current outpatient medications   No current facility-administered medications on file prior to encounter.     No current outpatient medications on file prior to encounter.      No current facility-administered medications on file prior to encounter.     No current outpatient medications on file prior to encounter.     Scheduled Meds:   acetaminophen  650 mg Oral Q4H    docusate sodium  100 mg Oral BID    famotidine (PF)  20 mg Intravenous Daily    insulin regular  10 Units Intravenous ED 1 Time    LIDOcaine HCL 10 mg/ml (1%)  10 mL Intradermal Once    [START ON 10/2/2024] mupirocin   Nasal BID    polyethylene glycol  17 g Oral BID    sodium bicarbonate        vancomycin (VANCOCIN) IV (PEDS and ADULTS)  1,000 mg Intravenous ED 1 Time     Continuous Infusions:   lactated ringers   Intravenous Continuous 125 mL/hr at 09/30/24 0412 New Bag at 09/30/24 0412    propofoL  0-50 mcg/kg/min Intravenous Continuous 6.5 mL/hr at 09/30/24 0144 20 mcg/kg/min at 09/30/24 0144    sodium bicarbonate 150 mEq   Intravenous Continuous         PRN Meds:  Current Facility-Administered Medications:     0.9%  NaCl infusion (for blood administration), , Intravenous, Q24H PRN    bisacodyL, 10 mg, Rectal, Daily PRN    dextrose 10%, 12.5 g, Intravenous, PRN    dextrose 10%, 25 g, Intravenous, PRN    melatonin, 6 mg, Oral, Nightly PRN    morphine, 2 mg, Intravenous, Q2H PRN    oxyCODONE, 5 mg, Oral, Q4H PRN    sodium bicarbonate, , ,     OBJECTIVE:   Vital Signs:  VITAL SIGNS:  "24 HR MIN & MAX LAST   Temp  Min: 92.7 °F (33.7 °C)  Max: 97.6 °F (36.4 °C)  (!) 92.7 °F (33.7 °C)   BP  Min: 102/46  Max: 174/87  (!) 174/87    Pulse  Min: 39  Max: 107  78    Resp  Min: 11  Max: 30  16    SpO2  Min: 80 %  Max: 100 %  (!) 80 %      HT: 5' 8" (172.7 cm)  WT: 54.4 kg (119 lb 14.9 oz)  BMI: 18.2   Intake/output: I/O this shift:  In: 579 [Blood:579]  Out: 150 [Urine:150]     Lines/drains/airway:       Peripheral IV - Single Lumen 09/29/24 2100 20 G Anterior;Distal;Left Forearm (Active)   Site Assessment Clean;Dry;Intact;Pink;No drainage;No warmth;No redness;No swelling 09/29/24 2358   Extremity Assessment Distal to IV Dry;Warm;Pink 09/29/24 2358   Dressing Status Clean;Dry;Intact 09/29/24 2358   Dressing Intervention First dressing 09/29/24 2358   Number of days: 0            Peripheral IV - Single Lumen 22 G Left;Posterior Wrist (Active)   Site Assessment Clean;Dry;No redness;Intact;No swelling 09/29/24 2050   Extremity Assessment Distal to IV No redness;No swelling;No abnormal discoloration 09/29/24 2050   Line Status Flushed 09/29/24 2050   Dressing Status Clean;Dry;Intact 09/29/24 2050   Number of days:             Chest Tube Right Midaxillary (Active)   Number of days:             Urethral Catheter (Active)   Number of days:        Physical Exam:  General:  Frail, ill-appearing, obtunded elderly man  HEENT:  Bruising to right scalp and neck. Right-sided periorbital ecchymosis. Skin tear to right temple.  CV:  RR  Resp/chest: Tachypnea, increased work of breathing on 8L O2 via OxyMask. Chest tube to right chest connected to pleurevac to suction with approximately 20 cc of serosanguinous fluid in reservoir.   GI:  Abdomen soft, non-tender, non-distended. Right inguinal hernia, easily reducible.   :  Hernandez in place with yellow urine in bag.  MSK:  RUE in sling and ortho splint. No spontaneous movement of extremities.   Neuro: GCS 6. Obtunded. Grunts to painful stimuli.   Skin/Wounds:  Bruising " noted to right chest, neck, scalp.    Labs:  Troponin:  Recent Labs     09/29/24 2157   TROPONINI 0.053*     CBC:  Recent Labs     09/29/24  2240   WBC 13.74  13.74*   RBC 1.73*   HGB 6.0*   HCT 20.3*      .3*   MCH 34.7*   MCHC 29.6*     CMP:  Recent Labs     09/29/24 2157   CALCIUM 8.3*   ALBUMIN 3.1*   *   K 5.6*   CO2 12*   *   BUN 76.0*   CREATININE 3.75*   ALKPHOS 51   ALT 41   AST 94*   BILITOT 0.5     Lactic Acid:  Recent Labs     09/29/24 2157   LACTATE 1.1     ETOH:  Recent Labs     09/29/24 2157   ETHANOL <10.0      Urine Drug Screen:  Recent Labs     09/30/24  0221   FENTANYL Negative   MDMA Negative      ABG  Recent Labs   Lab 09/30/24  0256   PH 7.190*   PO2 143.0*   PCO2 46.0*   HCO3 17.6*         Diagnostic Results:  X-Ray Chest 1 View   Final Result      Residual small right-sided pneumothorax.  Left upper lobe pulmonary mass.         Electronically signed by: Levi Hardy   Date:    09/29/2024   Time:    22:57      CT Cervical Spine Without Contrast   Final Result      No evidence of acute fracture or listhesis of the cervical spine.  Severe multilevel degenerative changes as described above.  Possible moderate spinal canal stenosis at C6-7.  Severe bilateral foraminal narrowing throughout the cervical spine.         Electronically signed by: Levi Hardy   Date:    09/29/2024   Time:    22:45      CT Head Without Contrast   Final Result      Significantly limited study due to motion and quantum mottle artifact.  No CT evidence of an acute intracranial process.         Electronically signed by: Levi Hardy   Date:    09/29/2024   Time:    22:13      CT Chest Abdomen Pelvis Without Contrast (XPD)   Final Result      1. Right inguinal hernia containing a loop of bowel which contains an air-fluid level.  Correlate on physical exam with point tenderness.  Incarcerated bowel is not excluded.  There is no evidence of obstructive process.   2. Right-sided pneumothorax  with chest tube in place.   3. Left upper lobe pulmonary mass concerning for neoplastic process.  Consider correlation with PET-CT if not already performed.   4. Refer to the body of the report for multiple other incidental findings.         Electronically signed by: Levi Hardy   Date:    09/29/2024   Time:    22:41      Xray Previous   Final Result      Xray Previous   Final Result      Xray Previous   Final Result      Xray Previous   Final Result      Xray Previous   Final Result      CT Previous   Final Result      X-Ray Humerus 2 View Right    (Results Pending)   X-Ray Chest 1 View    (Results Pending)   X-Ray Chest 1 View    (Results Pending)       ASSESSMENT & PLAN:    Patient is an 84 yM who presented as a transfer from OSH after being found down in his house with blood per rectum.    ESRD, JALYN on CKD  Elevated troponin  Acute respiratory failure  Right pneumothorax  Right humerus fracture  GI bleed  Incidental right inguinal hernia  Incidental left lung mass  - Admit to TICU under surgical critical care service  - Consult orthopedic surgery  - Consult GI  - Consult cardiology  - Consult nephrology  - Consult RT  - Close hemodynamic monitoring and management of mechanical ventilation  - Propofol gtt  - Bicarb gtt   - Trend H&H; transfuse as indicated  - Trend troponin to peak  - Obtain STAT echo, repeat EKG  - Monitor electrolyte status, correct as needed  - Monitor ABG   - Chest tube to continuous suction  - mIVF   - MMPC  - Daily CXRs  - Daily labs  - DVT ppx: SCDs, hold lovenox due to GI bleed    Kp Perez M.D.  PGY-I, [employer] [facility]ol[facility]ology  Trauma Surgery/Surgical Critical Care

## 2024-09-30 NOTE — DISCHARGE SUMMARY
[facility] - [facility]  Discharge Summary      Patient Name: Brittany Ugarte  MRN: [id number]  Admission Date: 9/29/2024  Hospital Length of Stay: 0 days  Death Date and Time:  9/30/24 12pm  Attending Physician: Silas Blanc Jr., *   Discharging Provider: Silas Blanc Jr, MD  Primary Care Provider: May, Primary Doctor    HPI:    Patient is an 84 yM who presented as a transfer from OSH after being found down in his house with blood per rectum. Per records, EMS performed needle decompression of the right chest in the field. At OSH, patient was found to have right-sided pneumothorax and a right chest tube was placed. Right humerus fracture was noted on plain films. He was also found to be anemic with Hgb of 5.4, suspected 2/2 GI bleed, but was not transfused at OSH due to blood bank/lab issues. Trauma surgery consulted upon arrival to our facility. Family present at bedside, who report that patient lives by himself and is independent with his ADLs. They note a history of ESRD, which has worsened over the past few months, although he has not been on dialysis in the past. Patient's daughter and son expressed the patient's wish to be DNR, but did desire intubation/mechanical ventilation if needed.     In the ED, patient was afebrile and normotensive. He was GCS of 6 on arrival, on 8 L supplemental O2 via oxymask. Hgb was 5.6, 2 units pRBC transfused in ED. CMP notable for sodium of 153, potassium of 5.6, chloride of 129, CO2 of 12, Cr 3.75, BUN 76. Troponin elevated to 0.053, CPK 1,384, .8. Lactate normal. UDS, EtOH negative.      ABG revealed pH 6.95, PCO2 81, PO2 73, bicarb 15.5. GCS of 6. Patient was intubated, propofol gtt started. Bradycardic into 30s during intubation, atropine given. Received total of 4 amps of bicarb in ED, started on bicarb drip.  Indwelling Lines/Drains at time of discharge:   Lines/Drains/Airways       Drain  Duration                  Chest Tube Right  Midaxillary -- days                  Hospital Course: Patient was admitted to the TICU. He was already made a DNR. He was still acidotic and was given 3 amps of bicarb. He was on no sedation and doing nothing and would not respond to any stimulus. This morning he began to decline and it was becoming difficult to keep his pressure up. He eventually became bradycardic and then went into PEA. No Code was called due to him being a DNR. I then auscultated his heart heard no heart tones and I also looked at his heart with the ultra sound no heart movement was visualized. At this time we called it at 12pm.    Consults:   Consults (From admission, onward)          Status Ordering Provider     Inpatient consult to Gastroenterology  Once        Provider:  (Not yet assigned)    Acknowledged JOEL STODDARD     Inpatient consult to Nephrology  Once        Provider:  (Not yet assigned)    Acknowledged JOEL STODDARD     Inpatient consult to Cardiology  Once        Provider:  (Not yet assigned)    Acknowledged JOEL STODDARD     Inpatient consult to Respiratory Care  Once        Provider:  (Not yet assigned)    Acknowledged JOEL STODDARD     Inpatient consult to Orthopedic Surgery  Once        Provider:  Casey Mota MD    Acknowledged MINNA SCHROEDER            Significant Diagnostic Studies: Labs: All labs within the past 24 hours have been reviewed  Radiology: X-Ray: CXR: X-Ray Chest 1 View (CXR):   Results for orders placed or performed during the hospital encounter of 09/29/24   X-Ray Chest 1 View    Narrative    EXAMINATION:  XR CHEST 1 VIEW    CLINICAL HISTORY:  Chest tube placement;    COMPARISON:  09/30/2024 at 03:53 hours    FINDINGS:  Single view of the chest shows slight decrease in size of the right-sided pneumothorax.  Right-sided chest tube appears to have been slightly advanced since the prior study.  Endotracheal and enteric tubes are stable.  Patchy opacities in both lungs are unchanged with small  effusions.  Heart size is stable.      Impression    1. Slight decreased size of the right-sided pneumothorax compared to the prior study.      Electronically signed by: Dameon Abdi MD  Date:    09/30/2024  Time:    07:47     CT scan: CT ABDOMEN PELVIS WITH CONTRAST: No results found for this visit on 09/29/24.  Cardiac Graphics: Echocardiogram: Transthoracic echo (TTE) complete (Cupid Only):   Results for orders placed or performed during the hospital encounter of 09/29/24   Echo   Result Value Ref Range    BSA 1.62 m2    LVOT stroke volume 46.2 cm3    LVIDd 3.2 (A) 3.5 - 6.0 cm    LVIDs 2.8 2.1 - 4.0 cm    IVS 0.9 0.6 - 1.1 cm    LVOT diameter 2.0 cm    LVOT area 3.1 cm2    FS 12.5 (A) 28 - 44 %    Left Ventricle Relative Wall Thickness 0.44 cm    PW 0.7 0.6 - 1.1 cm    LV mass 65.3 g    LV Mass Index 39.8 g/m2    TR Max Geoffrey 2.2 m/s    LVOT peak geoffrey 0.9 m/s    TAPSE 1.20 cm    LA size 3.0 cm    AV mean gradient 2.0 mmHg    AV peak gradient 4.8 mmHg    Ao peak geoffrey 1.1 m/s    Ao VTI 17.2 cm    LVOT peak VTI 14.7 cm    AV valve area 2.7 cm²    AV Velocity Ratio 0.82     AV index (prosthetic) 0.85     FIDELIA by Velocity Ratio 2.6 cm²    Triscuspid Valve Regurgitation Peak Gradient 19 mmHg    ZLVIDS -0.17     ZLVIDD -3.66     Narrative      Left Ventricle: The left ventricle is normal in size. Normal wall   thickness. There is normal systolic function with a visually estimated   ejection fraction of 55 - 60%. Grade I diastolic dysfunction.    Right Ventricle: Normal right ventricular cavity size. Systolic   function is moderately reduced.    Tricuspid Valve: There is mild regurgitation.    Pericardium: There is no pericardial effusion.         Pending Diagnostic Studies:       Procedure Component Value Units Date/Time    Troponin I [[id number]]     Order Status: Sent Lab Status: No result     Specimen: Blood           Final Active Diagnoses:    Diagnosis Date Noted POA    PRINCIPAL PROBLEM:  Acute hypoxemic respiratory  failure [J96.01] 2024 Yes    Traumatic pneumothorax [S27.0XXA] 2024 Yes    Closed fracture of distal end of right humerus with routine healing [S42.401D] 2024 Not Applicable    Acidosis [E87.20] 2024 Yes    ESRD (end stage renal disease) [N18.6] 2024 Yes     Chronic    Mild intermittent asthma without complication [J45.20] 2024 Yes     Chronic      Problems Resolved During this Admission:      Discharged Condition:     Disposition:     Follow Up:    Patient Instructions:   No discharge procedures on file.  Medications:  None  Time spent on the discharge of patient: 15 minutes         Silas Blanc Jr, MD, MS  Trauma Critical Care Surgery   [facility] - [facility]

## 2024-09-30 NOTE — ED NOTES
20g to L forearm infiltrated, bolus paused to start new IV site. Bruising and swelling noted above iv site.

## 2024-09-30 NOTE — ED PROVIDER NOTES
Encounter Date: 9/29/2024    SCRIBE #1 NOTE: I, Breanne Lopez, am scribing for, and in the presence of,  Lucho Borjas MD. I have scribed the following portions of the note - Other sections scribed: HPI, ROS, PE.       History     Chief Complaint   Patient presents with    Transfer     Transfer from [facility] for trauma svcs. Dx'd w/ pneumothorac, humeral fx s/p fall and GIB     Patient is an 84-year-old male presenting to the ED transferred from [facility] for trauma services. Per transfer records, the pt was found down by family with blood coming from his rectum earlier today. The pt was suspected to have GI bleed and was anemic upon arrival to the facility, but the outside facility did not give blood products due to the length of time that it would take to type and cross match the pt's blood type. The pt was needle decompressed in the field due to a suspected pneumothorax, and a chest tube was placed upon arrival to the sending facility. In addition, imaging showed a right humerus fracture. A bag of medication that was transferred with the pt shows a history of BPH and possibly COPD. Review of symptoms is unattainable due to altered mental status.     The history is provided by the patient. The history is limited by the condition of the patient. No  was used.     Review of patient's allergies indicates:  No Known Allergies  No past medical history on file.  No past surgical history on file.  No family history on file.     Review of Systems   Unable to perform ROS: Mental status change       Physical Exam     Initial Vitals [09/29/24 2043]   BP Pulse Resp Temp SpO2   (!) 107/52 72 20 97.6 °F (36.4 °C) 98 %      MAP       --         Physical Exam    Constitutional: He appears well-developed and well-nourished. No distress.   HENT:   Head: Normocephalic.   Eyes: Conjunctivae and EOM are normal. Pupils are equal, round, and reactive to light. Right eye exhibits no discharge. Left eye  exhibits no discharge. No scleral icterus.   Neck: No tracheal deviation present.   Cardiovascular:  Normal rate, regular rhythm, normal heart sounds and intact distal pulses.           No murmur heard.  Pulmonary/Chest: Breath sounds normal. No stridor. No respiratory distress. He has no wheezes. He has no rales.   Chest tube to the right chest wall with 10-20 CC's of blood in reservoir.    Abdominal: Abdomen is soft. He exhibits no distension. There is no abdominal tenderness. There is no rebound and no guarding.   Genitourinary:    Genitourinary Comments: Catheter in place with yellow urine in bag.      Musculoskeletal:         General: No tenderness or edema. Normal range of motion.      Comments: Blood to the upper right upper extremity.      Neurological: He has normal reflexes. No cranial nerve deficit.   Skin: Skin is warm and dry. No rash noted. No erythema. No pallor.   Bruising to the right scalp, neck, chest, and right upper extremity.          ED Course   Critical Care    Date/Time: 9/29/2024 9:44 PM    Performed by: Lucho Borjas MD  Authorized by: Lucho Borjas MD  Direct patient critical care time: 61 minutes  Total critical care time (exclusive of procedural time) : 61 minutes  Critical care time was exclusive of separately billable procedures and treating other patients.  Critical care was necessary to treat or prevent imminent or life-threatening deterioration of the following conditions: trauma.  Critical care was time spent personally by me on the following activities: development of treatment plan with patient or surrogate, blood draw for specimens, discussions with consultants, interpretation of cardiac output measurements, evaluation of patient's response to treatment, examination of patient, obtaining history from patient or surrogate, ordering and review of laboratory studies, ordering and performing treatments and interventions, ordering and review of radiographic studies,  pulse oximetry, re-evaluation of patient's condition, review of old charts and vascular access procedures.      Intubation    Date/Time: 9/30/2024 2:25 AM  Location procedure was performed: [facility] EMERGENCY DEPARTMENT    Performed by: Lucho Borjas MD  Authorized by: Lucho Borjas MD  Indications: respiratory failure  Intubation method: video-assisted  Patient status: paralyzed (RSI)  Preoxygenation: nonrebreather mask and bag valve mask  Sedatives: etomidate  Paralytic: rocuronium  Laryngoscope size: Glide 4  Tube size: 7.5 mm  Tube type: cuffed  Number of attempts: 1  Post-procedure assessment: chest rise and CO2 detector  Breath sounds: clear  Cuff inflated: yes  ETT to teeth: 25 cm  Tube secured with: ETT lucia  Chest x-ray interpreted by me.  Chest x-ray findings: endotracheal tube in appropriate position  Complications: No        Labs Reviewed   COMPREHENSIVE METABOLIC PANEL - Abnormal       Result Value    Sodium 153 (*)     Potassium 5.6 (*)     Chloride 129 (*)     CO2 12 (*)     Glucose 90      Blood Urea Nitrogen 76.0 (*)     Creatinine 3.75 (*)     Calcium 8.3 (*)     Protein Total 5.6 (*)     Albumin 3.1 (*)     Globulin 2.5      Albumin/Globulin Ratio 1.2      Bilirubin Total 0.5      ALP 51      ALT 41      AST 94 (*)     eGFR 15      Anion Gap 12.0      BUN/Creatinine Ratio 20     PROTIME-INR - Abnormal    PT 16.8 (*)     INR 1.4 (*)    B-TYPE NATRIURETIC PEPTIDE - Abnormal    Natriuretic Peptide 354.8 (*)    TROPONIN I - Abnormal    Troponin-I 0.053 (*)    CBC WITH DIFFERENTIAL - Abnormal    WBC 13.74 (*)     RBC 1.73 (*)     Hgb 6.0 (*)     Hct 20.3 (*)     .3 (*)     MCH 34.7 (*)     MCHC 29.6 (*)     RDW 18.6 (*)     Platelet 212      MPV 11.6 (*)     NRBC% 15.4     CK - Abnormal    Creatine Kinase 1,384 (*)    MANUAL DIFFERENTIAL - Abnormal    WBC 13.74      Neutrophils % 89      Lymphs % 7      Monocytes % 4      nRBC % 35      Neutrophils Abs 12.2286 (*)     Lymphs Abs  0.9618      Monocytes Abs 0.5496      Platelets Normal      RBC Morph Abnormal (*)     Polychromasia 1+ (*)     Poikilocytosis 1+ (*)     Anisocytosis 1+ (*)     Macrocytosis 2+ (*)     Volin Cells 1+ (*)    BLOOD GAS - Abnormal    Sample Type Arterial Blood      Sample site Left Brachial Artery      Drawn by AKBAR FERNANDEZ      pH, Blood gas <6.950 (*)     pCO2, Blood gas 81.0 (*)     pO2, Blood gas 73.0 (*)     Sodium, Blood Gas 148 (*)     Potassium, Blood Gas 5.7 (*)     Calcium Level Ionized 1.32 (*)     TOC2, Blood gas 18.0      Base Excess, Blood gas -16.00 (*)     sO2, Blood gas 78.4      HCO3, Blood gas 15.5 (*)     THb, Blood gas <6.5 (*)     O2 Hb, Blood Gas 89.1 (*)     CO Hgb 0.0 (*)     Met Hgb 0.0 (*)     Allens Test N/A      Oxygen Device, Blood gas Oxymizer      LPM 6     LACTIC ACID, PLASMA - Normal    Lactic Acid Level 1.1     LIPASE - Normal    Lipase Level 19     MAGNESIUM - Normal    Magnesium Level 2.50     APTT - Normal    PTT 29.3     ALCOHOL,MEDICAL (ETHANOL) - Normal    Ethanol Level <10.0     COVID/FLU A&B PCR - Normal    Influenza A PCR Not Detected      Influenza B PCR Not Detected      SARS-CoV-2 PCR Not Detected      Narrative:     The Xpert Xpress SARS-CoV-2/FLU/RSV plus is a rapid, multiplexed real-time PCR test intended for the simultaneous qualitative detection and differentiation of SARS-CoV-2, Influenza A, Influenza B, and respiratory syncytial virus (RSV) viral RNA in either nasopharyngeal swab or nasal swab specimens.         BLOOD CULTURE OLG   BLOOD CULTURE OLG   CBC W/ AUTO DIFFERENTIAL    Narrative:     The following orders were created for panel order CBC auto differential.  Procedure                               Abnormality         Status                     ---------                               -----------         ------                     CBC with Differential[[id number]]       Abnormal            Final result               Manual Differential[[id number]]         Abnormal             Final result                 Please view results for these tests on the individual orders.   DRUG SCREEN, URINE (BEAKER)   CBC W/ AUTO DIFFERENTIAL    Narrative:     The following orders were created for panel order CBC auto differential.  Procedure                               Abnormality         Status                     ---------                               -----------         ------                     CBC with Differential[[id number]]                                                        Please view results for these tests on the individual orders.   LACTIC ACID, PLASMA   MAGNESIUM   MAGNESIUM   PHOSPHORUS   PHOSPHORUS   COMPREHENSIVE METABOLIC PANEL   C-REACTIVE PROTEIN   PREALBUMIN   CBC WITH DIFFERENTIAL   BLOOD GAS   TROPONIN I   TROPONIN I   TYPE & SCREEN    Group & Rh O POS      Indirect Estee GEL NEG      Specimen Outdate 10/02/2024 23:59     ABORH RETYPE    ABORH Retype O POS     POCT GLUCOSE MONITORING CONTINUOUS   PREPARE RBC SOFT    UNIT NUMBER [id number]      UNIT ABO/RH O POS      DISPENSE STATUS Issued      Unit Expiration [id number]      Product Code P6022G89      Unit Blood Type Code 5100      CROSSMATCH INTERPRETATION Compatible      UNIT NUMBER [id number]      UNIT ABO/RH O POS      DISPENSE STATUS Issued      Unit Expiration [id number]      Product Code F7437V37      Unit Blood Type Code 5100      CROSSMATCH INTERPRETATION Compatible            Imaging Results              X-Ray Chest 1 View (In process)                      X-Ray Humerus 2 View Right (In process)                      X-Ray Chest 1 View (Final result)  Result time 09/29/24 22:57:33      Final result by Levi Hardy MD (09/29/24 22:57:33)                   Impression:      Residual small right-sided pneumothorax.  Left upper lobe pulmonary mass.      Electronically signed by: Levi Hardy  Date:    09/29/2024  Time:    22:57               Narrative:    EXAMINATION:  XR CHEST 1 VIEW    CLINICAL  HISTORY:  Pneumothorax, unspecified    COMPARISON:  None    FINDINGS:  Single AP chest radiograph is provided for evaluation.    Cardiac silhouette is normal in size. Partially calcified aorta.  Moderate pulmonary edema.    Right-sided chest tube with residual right-sided small pneumothorax.  Patchy opacities are seen in bilateral lungs with a left upper lobe pulmonary mass, better seen on same day chest CT.    No acute osseous findings.                                       CT Cervical Spine Without Contrast (Final result)  Result time 09/29/24 22:45:37      Final result by Levi Hardy MD (09/29/24 22:45:37)                   Impression:      No evidence of acute fracture or listhesis of the cervical spine.  Severe multilevel degenerative changes as described above.  Possible moderate spinal canal stenosis at C6-7.  Severe bilateral foraminal narrowing throughout the cervical spine.      Electronically signed by: Levi Hardy  Date:    09/29/2024  Time:    22:45               Narrative:    EXAMINATION:  CT CERVICAL SPINE WITHOUT CONTRAST    CLINICAL HISTORY:  Polytrauma, blunt;    TECHNIQUE:  Axial CT images were obtained through the cervical spine without contrast.  Coronal and sagittal reconstructions submitted and interpreted. Automated exposure control utilized to limit dose to the patient.    DLP: 285 mGy-cm    COMPARISON:  None    FINDINGS:  Exam is severely limited due to motion artifact.  There is straightening of the cervical lordosis.  The lateral masses of C1 and C2 are intact with severe atlantodental degenerative changes.  The vertebral body heights appear maintained with severe multilevel spondylosis of the cervical spine.  There is no evidence of acute fracture.  There is severe multilevel marginal endplate osteophytes, facet arthropathy, uncovertebral spurring, and multilevel disc height loss.  Severe bilateral foraminal narrowing is seen throughout the cervical spine.  There is grade 1  retrolisthesis is seen of C6 on C7.  There is likely moderate spinal canal stenosis at C6-7.  The prevertebral soft tissues are unremarkable.  Right apical pneumothorax is again noted.                                       CT Head Without Contrast (Final result)  Result time 09/29/24 22:13:53      Final result by Levi Hardy MD (09/29/24 22:13:53)                   Impression:      Significantly limited study due to motion and quantum mottle artifact.  No CT evidence of an acute intracranial process.      Electronically signed by: Levi Hardy  Date:    09/29/2024  Time:    22:13               Narrative:    EXAMINATION:  CT HEAD WITHOUT CONTRAST    CLINICAL HISTORY:  Polytrauma, blunt; Age:85 y/o    TECHNIQUE:  Low dose axial images were obtained through the head.  Coronal and sagittal reformations were also performed. Contrast was not administered.  Dose reduction techniques including automatic exposure control (AEC) were utilized.  DLP: 1080mGycm limited study due to motion artifact.    COMPARISON:  None.    FINDINGS:  INTRACRANIAL: There is age-appropriate global loss of volume.  There are mild periventricular and subcortical white matter hypodense changes, which are nonspecific, but can be seen with chronic microvascular ischemic disease.  Atherosclerotic calcifications of the intracranial carotid arteries are noted.   No acute intracranial hemorrhage.  No hydrocephalus.  No intracranial mass effect.    SINUSES: Visualized paranasal sinuses and mastoids are clear.    SKULL/SCALP: Visualized osseous structures are normal.    ORBITS: Visualized orbits are normal.                                       CT Chest Abdomen Pelvis Without Contrast (XPD) (Final result)  Result time 09/29/24 22:41:39      Final result by Levi Hardy MD (09/29/24 22:41:39)                   Impression:      1. Right inguinal hernia containing a loop of bowel which contains an air-fluid level.  Correlate on physical exam with  point tenderness.  Incarcerated bowel is not excluded.  There is no evidence of obstructive process.  2. Right-sided pneumothorax with chest tube in place.  3. Left upper lobe pulmonary mass concerning for neoplastic process.  Consider correlation with PET-CT if not already performed.  4. Refer to the body of the report for multiple other incidental findings.      Electronically signed by: Levi Hardy  Date:    09/29/2024  Time:    22:41               Narrative:    EXAMINATION:  CT CHEST ABDOMEN PELVIS WITHOUT CONTRAST(XPD)    CLINICAL HISTORY:  Polytrauma, blunt;    TECHNIQUE:  Low dose axial images, sagittal and coronal reformations were obtained from the thoracic inlet through the pelvis without IV contrast. Dose reduction techniques including automatic exposure control (AEC) were utilized.    Dose (DLP): 723 mGycm    COMPARISON:  None    FINDINGS:  CT CHEST:    Exam is limited by motion artifact.  The soft tissues of the neck are unremarkable.  There is a right-sided pneumothorax with a right-sided chest tube coursing into the minor fissure on the right.  Bilateral lungs demonstrate prominent interstitial reticulations with scattered emphysematous changes and areas of septal thickening.  Scattered ground-glass opacities are seen in bilateral lungs.  Left upper lobe masslike consolidation is present measuring approximately 3.5 x 2.3 cm in axial dimensions, concerning for neoplastic process..  Dependent lung changes are also present.  The thoracic aorta is ectatic.  Subcutaneous emphysema along the right lateral chest wall is present, likely introduced during chest tube placement.  Mild dilation of the pulmonary artery measuring 4.2 cm in diameter.  Coronary artery atherosclerotic calcifications are present.  The heart is normal in size.  No pericardial effusion.  Mildly prominent mediastinal lymph nodes are seen, both infectious and neoplastic processes are not excluded.  The central airways are clear.   Moderate multilevel degenerative changes of the thoracic spine are present.    CT ABDOMEN/PELVIS:    Liver is normal in size and attenuation.  There is no intrahepatic or extrahepatic biliary ductal dilatation.  The gallbladder is within normal limits.  Spleen is normal in size.  Nodular thickening of bilateral adrenal glands is noted.  Correlate for adrenal hyperplasia.  Multiple bilateral nonobstructing stones are seen in bilateral kidneys measuring up to 4 mm in diameter.  Kidneys demonstrate a lobulated contour likely reflecting areas of scarring.  There is fatty atrophy of the pancreas.  No obvious pancreatic pathology within the limitations of the exam.  The aorta is normal in caliber throughout its course with moderate atherosclerotic disease.  A Hernandez catheter seen within the bladder.  There is no evidence of obstructive process or inflammatory changes along the GI tract.  Colonic diverticulosis is present without evidence of diverticulitis.  The appendix is not visualized however there are no inflammatory changes to suggest acute appendicitis.  There is a small bowel containing right inguinal hernia with no evidence of obstruction.  Correlate with point tenderness on physical exam.  There is diffuse anasarca present.  Severe multilevel degenerative changes of the thoracolumbar spine are present.  Superior endplate compression fracture deformity of the L1 vertebral body.  The prostate is enlarged.  There is no evidence of free air or free fluid.  No pathologically enlarged lymph nodes are identified.                                       Medications   0.9%  NaCl infusion (for blood administration) (has no administration in time range)   lactated ringers bolus 1,000 mL (1,000 mLs Intravenous Bolus from Bag 9/30/24 0147)   insulin regular injection 10 Units 0.1 mL (has no administration in time range)   dextrose 10% bolus 125 mL 125 mL (has no administration in time range)   dextrose 10% bolus 250 mL 250 mL  (has no administration in time range)   sodium bicarbonate 150 mEq in dextrose 5 % 1000 mL infusion (has no administration in time range)   piperacillin-tazobactam (ZOSYN) 4.5 g in D5W 100 mL IVPB (MB+) (4.5 g Intravenous New Bag 9/30/24 0206)   vancomycin in dextrose 5 % 1 gram/250 mL IVPB 1,000 mg (has no administration in time range)   lactated ringers infusion (has no administration in time range)   acetaminophen tablet 650 mg (has no administration in time range)   oxyCODONE immediate release tablet 5 mg (has no administration in time range)   morphine injection 2 mg (has no administration in time range)   famotidine (PF) injection 20 mg (has no administration in time range)   melatonin tablet 6 mg (has no administration in time range)   polyethylene glycol packet 17 g (0 g Oral Hold 9/30/24 0145)   docusate sodium capsule 100 mg (has no administration in time range)   bisacodyL suppository 10 mg (has no administration in time range)   propofol (DIPRIVAN) 10 mg/mL infusion (20 mcg/kg/min × 54.4 kg Intravenous New Bag 9/30/24 0144)   mupirocin 2 % ointment (has no administration in time range)   lactated ringers bolus 1,000 mL (0 mLs Intravenous Stopped 9/30/24 0100)   calcium gluconate 1 g in NS IVPB (premixed) (1 g Intravenous New Bag 9/30/24 0058)   pantoprazole injection 80 mg (80 mg Intravenous Given 9/30/24 0000)   sodium bicarbonate 8.4 % (1 mEq/mL) injection (50 mEq Intravenous Given 9/30/24 0127)   etomidate injection (20 mg Intravenous Given 9/30/24 0118)   rocuronium injection (100 mg Intravenous Given 9/30/24 0127)   atropine injection (1 mg Intravenous Given 9/30/24 0117)     Medical Decision Making  Differential diagnosis include but are not limited to: pneumothorax, anemia, renal failure, arm fracture, ich, sepsis       Amount and/or Complexity of Data Reviewed  External Data Reviewed: notes.     Details: Per transfer records, the pt was found down by family with blood coming from his rectum  earlier today. The pt was suspected to have GI bleed and was anemic upon arrival to the facility, but the outside facility did not give blood products due to the length of time that it would take to type and cross match the pt's blood type. The pt was needle decompressed in the field due to a suspected pneumothorax, and a chest tube was placed upon arrival to the sending facility. In addition, imaging showed a right humerus fracture. A bag of medication that was transferred with the pt shows a history of BPH and possibly COPD. Review of symptoms is unattainable due to altered mental status.   Labs: ordered. Decision-making details documented in ED Course.  Radiology: ordered and independent interpretation performed. Decision-making details documented in ED Course.    Risk  OTC drugs.  Prescription drug management.            Scribe Attestation:   Scribe #1: I performed the above scribed service and the documentation accurately describes the services I performed. I attest to the accuracy of the note.    Attending Attestation:           Physician Attestation for Scribe:  Physician Attestation Statement for Scribe #1: I, Lucho Borjas MD, reviewed documentation, as scribed by Breanne Lopez in my presence, and it is both accurate and complete.             ED Course as of 09/30/24 0226   Sun Sep 29, 2024   2312 Paged trauma services. [RB]      ED Course User Index  [RB] Breanne Lopez                           Clinical Impression:  Final diagnoses:  [W19.XXXA] Fall  [J93.9] Pneumothorax  [K92.2] Gastrointestinal hemorrhage, unspecified gastrointestinal hemorrhage type (Primary)  [D64.9] Anemia, unspecified type  [R91.8] Lung mass  [S42.401A] Closed fracture of distal end of right humerus, unspecified fracture morphology, initial encounter  [J93.9] Pneumothorax, unspecified type  [N17.9] JALYN (acute kidney injury)  [R79.89] Elevated troponin          ED Disposition Condition    Admit                 Lucho Borjas  MD NICOLE  09/30/24 0004       Lucho Borjas MD  09/30/24 0226

## 2024-09-30 NOTE — PROCEDURES
Chest Tube Placement Procedure  Patient Name: Brittany Ugarte  MRN: [id number]  YOB: 1940  Admit Date: 9/29/2024  HD#0  Date of Procedure: 09/30/2024    Procedure: Insertion of right chest tube  Indications: Pneumothorax, continued air leak, refractory to more inferior right chest tube already in place  Size: 30Fr    Procedure details:  Medical need for the procedure discussed with the patient's daughter, his surrogate decision-maker. Risks and benefits were discussed with daughter over the phone, informed consent obtained.   The right chest wall was then prepped and draped in sterile surgical fashion. 10mL of lidocaine was injected into the skin at the planned site of chest tube insertion.  A small transverse incision was made in the right anterior axillary line with a 10 blade and the pleural cavity was entered with a Komal clamp. A rush of air escaped. A finger sweep was performed to assure I was within pleural space and lung tissue free, which it was. A 30Fr chest tube was placed in posterior/superior position which entered the pleural cavity smoothly. Upon insertion there was immediate return of elle blood and air. The tube was secured with silk suture, xeroform dressing, dry gauze, and tape. There were no immediate complications.    Plan:  - Confirm placement with stat chest xray  - Daily morning chest xrays  - Accurate chest tube output documentation q shift  - Chest tube to wall suction    Kp Perez M.D.  PGY-I, [employer] [facility]olaryngology  Trauma Surgery/Surgical Critical Care

## 2024-09-30 NOTE — CONSULTS
[facility] [address] [facility]  Orthopedics  Consult Note    Patient Name: Brittany Ugarte  MRN: [id number]  Admission Date: 9/29/2024  Hospital Length of Stay: 0 days  Attending Provider: Gt Moreno MD  Primary Care Provider: May, Primary Doctor      Subjective:     Principal Problem:<principal problem not specified>    Chief Complaint:   Chief Complaint   Patient presents with    Transfer     Transfer from [facility] for trauma svcs. Dx'd w/ pneumothorac, humeral fx s/p fall and GIB      HPI: 83 YO M transferred here from OSH. Was found down in his home. Reported GI bleed and Hgb 5.6 on admit. Hx ESRD, CKD not on dialysis.   Per report has been declining. He is intubated in the ICU. Off sedation and non- responsive. He is a DNR. Was found to have R humerus fracture and Orthopedics was consulted.     No past medical history on file.    No past surgical history on file.    Review of patient's allergies indicates:  No Known Allergies    Current Facility-Administered Medications   Medication    0.9%  NaCl infusion (for blood administration)    acetaminophen tablet 650 mg    bisacodyL suppository 10 mg    dextrose 10% bolus 125 mL 125 mL    dextrose 10% bolus 250 mL 250 mL    docusate sodium capsule 100 mg    insulin regular injection 10 Units 0.1 mL    lactated ringers infusion    LIDOcaine HCL 10 mg/ml (1%) injection 10 mL    melatonin tablet 6 mg    morphine injection 2 mg    [START ON 10/2/2024] mupirocin 2 % ointment    oxyCODONE immediate release tablet 5 mg    pantoprazole injection 40 mg    polyethylene glycol packet 17 g    propofol (DIPRIVAN) 10 mg/mL infusion    sodium bicarbonate 1 mEq/mL (8.4 %) solution    sodium bicarbonate 150 mEq in D5W 1,000 mL infusion    sodium phosphate 30 mmol in D5W 250 mL IVPB     Family History    None       Tobacco Use    Smoking status: Not on file    Smokeless tobacco: Not on file   Substance and Sexual Activity    Alcohol use: Not on file    Drug use: Not  "on file    Sexual activity: Not on file     ROSunable to obtain 2/2 clinical condition  Objective:     Vital Signs (Most Recent):  Temp: (!) 92.7 °F (33.7 °C) (09/30/24 0404)  Pulse: 88 (09/30/24 0600)  Resp: (!) 26 (09/30/24 0600)  BP: 112/64 (09/30/24 0600)  SpO2: 99 % (09/30/24 0600) Vital Signs (24h Range):  Temp:  [92.7 °F (33.7 °C)-97.6 °F (36.4 °C)] 92.7 °F (33.7 °C)  Pulse:  [] 88  Resp:  [11-30] 26  SpO2:  [80 %-100 %] 99 %  BP: (102-174)/(30-87) 112/64     Weight: 54.4 kg (119 lb 14.9 oz)  Height: 5' 8" (172.7 cm)  Body mass index is 18.24 kg/m².      Intake/Output Summary (Last 24 hours) at 9/30/2024 1002  Last data filed at 9/30/2024 0605  Gross per 24 hour   Intake 579 ml   Output 240 ml   Net 339 ml       Ortho/SPM Exam  General elderly, frail, on vent in ICU    RUE: splint removed, some ecchymosis noted. Skin in tact, compartments very soft.  Elbow stable through rom, no gross instability noted. Had well perfused distally.       Significant Labs: CBC:   Recent Labs   Lab 09/29/24  2240 09/30/24  0652   WBC 13.74  13.74* 2.77  2.77*   HGB 6.0* 8.3*   HCT 20.3* 25.0*    172     All pertinent labs within the past 24 hours have been reviewed.  Recent Lab Results  (Last 5 results in the past 72 hours)        09/30/24  0652   09/30/24  0651   09/30/24  0256   09/30/24  0225   09/30/24  0221        Phencyclidine         Negative       Albumin/Globulin Ratio   1.2             Albumin   2.3             ALP   42             Allens Test     N/A           ALT   37             Amphetamines, Urine         Negative       Anion Gap   13.0             AST   79             Barbituates, Urine         Negative       Baso # 0.0277               Basophil % 1               Benzodiazepine, Urine         Negative       BILIRUBIN TOTAL   0.8             BUN   77.5             BUN/CREAT RATIO   23             [place]/Echinocytes 1+               Calcium   7.8             Calcium Level Ionized     1.22           " Cannabinoids, Urine         Negative       Chloride   126             CO2   19             Cocaine, Urine         Negative       Creatinine   3.32             CRP   71.70             Drawn by     KW RRT           eGFR   18             Fentanyl, Urine         Negative       FIO2, Blood gas     100           Flow     50           Globulin, Total   1.9             Glucose   100             Gran # (ANC) 2.216               Hematocrit 25.0               Hemoglobin 8.3               Lactic Acid Level 2.0               Lymph # 0.4432               Lymphocyte % 16               Magnesium    2.00             MCH 31.4               MCHC 33.2               MCV 94.7               MDMA, Urine         Negative       [facility] Vt     450           MODE     AC           MPV 11.1               Myelocytes 3               Neutrophils Relative 80               nRBC 41.5               nRBC % 108               O2 Hb, Blood Gas     96.3           QRS Duration       116         [facility] QTC Calculation       476         Opiates, Urine         Negative       Oxygen Device, Blood gas     Ventilator           PEEP     5.0           pH, Urine         6.0       Phosphorus Level   6.4             Platelet Estimate Normal               Platelet Count 172               Base Excess, Blood gas     -9.90           CO Hgb     0.0           POC HCO3     17.6           Met Hgb     0.0           POC PCO2     46.0           POC PH     7.190           POC PO2     143.0           Poikilocytosis 1+               Potassium   4.4             Potassium, Blood Gas     4.8           PROTEIN TOTAL   4.2             RBC 2.64               RBC Morph Abnormal               RDW 18.8               [facility] RR     26           Sample site     Left Brachial Artery           Sample Type     Arterial Blood           sO2, Blood gas     98.7           Sodium   158             Sodium, Blood Gas     149           Specific Gravity, Urine Auto         1.025       Target Cells 1+                TOC2, Blood gas     19.0           THb, Blood gas     7.7           Troponin I   0.045             WBC 2.77                2.77                                       Significant Imaging: I have reviewed all pertinent imaging results/findings.  X-Ray Humerus 2 View Right    Result Date: 9/30/2024  EXAMINATION: XR HUMERUS 2 VIEW RIGHT CLINICAL HISTORY: trauma; COMPARISON: None. FINDINGS: There is evidence of a comminuted displaced fracture of the distal humerus with no significant change in position or alignment as compared with the previous exam from an outside institution dated the same day at 02:23 p.m. There are some degenerative changes of the acromioclavicular joint with decreased distance between the humeral head in the acromion which may indicate the presence of a rotator cuff injury No blastic or lytic lesions. Soft tissues within normal limits.     Degenerative changes of the proximal humerus and shoulder with changes suggestive perhaps of a rotator cuff injury. Fracture of the distal humerus with no other definite fractures or dislocations identified Electronically signed by: Davey Brown Date:    09/30/2024 Time:    05:52      Assessment/Plan:   83 YO M here after being found down in his home  Ortho consulted for his right supracondylar distal humerus fracture  This appears to be an old fracture; corticated bone edges and pseudarthrosis noted  Splint to RUE removed  No orthopedic intervention at this time; should his clinical picture improve; he can mobilize with a sling PRN  Ortho to sign off; please call with questions.     The above findings, diagnostics, and treatment plan were discussed with Dr. Monrealo is in agreement with the plan of care except as stated in additional documentation.       DANAE Locke  Orthopedic Trauma Surgery  [facility]

## 2024-09-30 NOTE — LOPA/MORA/SWTA/AOC/AEB
[facility] ([facility])  Notification of Referral  [facility] Contact # [phone]        Thank you for the referral of this patient to determine suitability for organ, tissue, and eye donation.  A chart review has been conducted (date):2024 at (time) 09:00 AM.    ? Potential candidate for organ donation - [facility] following patient. Any changes in patients condition, discussion of withdrawing the vent or brain death exams, or family mention of donation immediately call [phone]. Refer all organ referrals within 1 hour of meeting the clinical triger of a patient with a neurological, anoxic, or life threatening injury and ONE of the following:    * GCS </= 8    * Loss of 2 or more brain stem reflexes    * Hypothermic Protocol Initiated    * Withdrawal of support discussion regardless of GCS    * Family mention of Donation    ? Potential for candidate for tissue and eye donation- call [facility] at [phone] within 2 hours of death for screening as a potential tissue and/or eye donor.      ? Potential candidate for eye donation - call [facility] at [phone] within 2 hours of death for screening as a potential eye donor.    ? NOT a candidate for organ/tissue/eye donation- call [facility] at [phone] within 2 hours of death to report the time of death.    ? Potential candidate for donation/ Referral Closed- Any changes in patients condition, GCS of 5 or less, discussion of withdrawing the vent, brain death exams, or family mention of donation immediately call [phone].      Screened by: BEN Lamar    [facility] Referral Number:   [id number]     Suitable for:  [x]Organ  [] Tissue  [] Eye  []Not suitable for Donation    Rule out Reason: N/A    Patient Name: Brittany Ugarte                   84 y.o. male  Patient MRN: [id number]  : 1940  DOD:  TOD:  Cause of Death: N/A    [x]Vented Patient  [facility] representative to approach family if appropriate  [x]DNR status obtained Referral of critical care  patients when family initiates Do Not Resuscitate  []Cardiac Death   Clinical Support Center to approach family via telephone if appropriate  []Donor Registry  Patient is listed in the Donor Registry    Completed by: Laurel Bundy

## 2024-10-05 LAB
BACTERIA BLD CULT: NORMAL
BACTERIA BLD CULT: NORMAL

## 2024-10-14 NOTE — PHYSICIAN QUERY
Please clarify the nature/etiology of the patient's hematological values:   Acute blood loss anemia